# Patient Record
Sex: FEMALE | Race: WHITE | NOT HISPANIC OR LATINO | URBAN - METROPOLITAN AREA
[De-identification: names, ages, dates, MRNs, and addresses within clinical notes are randomized per-mention and may not be internally consistent; named-entity substitution may affect disease eponyms.]

---

## 2024-09-21 ENCOUNTER — EMERGENCY (EMERGENCY)
Facility: HOSPITAL | Age: 44
LOS: 1 days | Discharge: HOME | End: 2024-09-21
Attending: STUDENT IN AN ORGANIZED HEALTH CARE EDUCATION/TRAINING PROGRAM
Payer: MEDICAID

## 2024-09-21 VITALS
WEIGHT: 162.92 LBS | HEART RATE: 50 BPM | SYSTOLIC BLOOD PRESSURE: 121 MMHG | RESPIRATION RATE: 18 BRPM | DIASTOLIC BLOOD PRESSURE: 79 MMHG | OXYGEN SATURATION: 96 % | HEIGHT: 64.96 IN | TEMPERATURE: 98 F

## 2024-09-21 VITALS
SYSTOLIC BLOOD PRESSURE: 110 MMHG | TEMPERATURE: 98 F | OXYGEN SATURATION: 98 % | RESPIRATION RATE: 18 BRPM | HEART RATE: 66 BPM | DIASTOLIC BLOOD PRESSURE: 69 MMHG

## 2024-09-21 LAB
ALBUMIN SERPL ELPH-MCNC: 3.8 G/DL — SIGNIFICANT CHANGE UP (ref 3.5–5)
ALP SERPL-CCNC: 62 U/L — SIGNIFICANT CHANGE UP (ref 40–120)
ALT FLD-CCNC: 30 U/L DA — SIGNIFICANT CHANGE UP (ref 10–60)
ANION GAP SERPL CALC-SCNC: 4 MMOL/L — LOW (ref 5–17)
APPEARANCE UR: ABNORMAL
APTT BLD: 33.6 SEC — SIGNIFICANT CHANGE UP (ref 24.5–35.6)
AST SERPL-CCNC: 14 U/L — SIGNIFICANT CHANGE UP (ref 10–40)
BACTERIA # UR AUTO: ABNORMAL /HPF
BASOPHILS # BLD AUTO: 0.05 K/UL — SIGNIFICANT CHANGE UP (ref 0–0.2)
BASOPHILS NFR BLD AUTO: 0.7 % — SIGNIFICANT CHANGE UP (ref 0–2)
BILIRUB SERPL-MCNC: 0.3 MG/DL — SIGNIFICANT CHANGE UP (ref 0.2–1.2)
BILIRUB UR-MCNC: NEGATIVE — SIGNIFICANT CHANGE UP
BLD GP AB SCN SERPL QL: SIGNIFICANT CHANGE UP
BUN SERPL-MCNC: 10 MG/DL — SIGNIFICANT CHANGE UP (ref 7–18)
CALCIUM SERPL-MCNC: 8.9 MG/DL — SIGNIFICANT CHANGE UP (ref 8.4–10.5)
CHLORIDE SERPL-SCNC: 112 MMOL/L — HIGH (ref 96–108)
CO2 SERPL-SCNC: 28 MMOL/L — SIGNIFICANT CHANGE UP (ref 22–31)
COLOR SPEC: YELLOW — SIGNIFICANT CHANGE UP
CREAT SERPL-MCNC: 0.68 MG/DL — SIGNIFICANT CHANGE UP (ref 0.5–1.3)
DIFF PNL FLD: ABNORMAL
EGFR: 109 ML/MIN/1.73M2 — SIGNIFICANT CHANGE UP
EOSINOPHIL # BLD AUTO: 0.14 K/UL — SIGNIFICANT CHANGE UP (ref 0–0.5)
EOSINOPHIL NFR BLD AUTO: 2.1 % — SIGNIFICANT CHANGE UP (ref 0–6)
EPI CELLS # UR: PRESENT
GLUCOSE SERPL-MCNC: 131 MG/DL — HIGH (ref 70–99)
GLUCOSE UR QL: NEGATIVE MG/DL — SIGNIFICANT CHANGE UP
HCG SERPL-ACNC: <1 MIU/ML — SIGNIFICANT CHANGE UP
HCT VFR BLD CALC: 39.6 % — SIGNIFICANT CHANGE UP (ref 34.5–45)
HGB BLD-MCNC: 12.8 G/DL — SIGNIFICANT CHANGE UP (ref 11.5–15.5)
IMM GRANULOCYTES NFR BLD AUTO: 0.3 % — SIGNIFICANT CHANGE UP (ref 0–0.9)
INR BLD: 0.95 RATIO — SIGNIFICANT CHANGE UP (ref 0.85–1.18)
KETONES UR-MCNC: NEGATIVE MG/DL — SIGNIFICANT CHANGE UP
LEUKOCYTE ESTERASE UR-ACNC: NEGATIVE — SIGNIFICANT CHANGE UP
LYMPHOCYTES # BLD AUTO: 1.7 K/UL — SIGNIFICANT CHANGE UP (ref 1–3.3)
LYMPHOCYTES # BLD AUTO: 25 % — SIGNIFICANT CHANGE UP (ref 13–44)
MCHC RBC-ENTMCNC: 28.2 PG — SIGNIFICANT CHANGE UP (ref 27–34)
MCHC RBC-ENTMCNC: 32.3 GM/DL — SIGNIFICANT CHANGE UP (ref 32–36)
MCV RBC AUTO: 87.2 FL — SIGNIFICANT CHANGE UP (ref 80–100)
MONOCYTES # BLD AUTO: 0.55 K/UL — SIGNIFICANT CHANGE UP (ref 0–0.9)
MONOCYTES NFR BLD AUTO: 8.1 % — SIGNIFICANT CHANGE UP (ref 2–14)
NEUTROPHILS # BLD AUTO: 4.34 K/UL — SIGNIFICANT CHANGE UP (ref 1.8–7.4)
NEUTROPHILS NFR BLD AUTO: 63.8 % — SIGNIFICANT CHANGE UP (ref 43–77)
NITRITE UR-MCNC: NEGATIVE — SIGNIFICANT CHANGE UP
NRBC # BLD: 0 /100 WBCS — SIGNIFICANT CHANGE UP (ref 0–0)
PH UR: 7 — SIGNIFICANT CHANGE UP (ref 5–8)
PLATELET # BLD AUTO: 211 K/UL — SIGNIFICANT CHANGE UP (ref 150–400)
POTASSIUM SERPL-MCNC: 3.6 MMOL/L — SIGNIFICANT CHANGE UP (ref 3.5–5.3)
POTASSIUM SERPL-SCNC: 3.6 MMOL/L — SIGNIFICANT CHANGE UP (ref 3.5–5.3)
PROT SERPL-MCNC: 7.2 G/DL — SIGNIFICANT CHANGE UP (ref 6–8.3)
PROT UR-MCNC: NEGATIVE MG/DL — SIGNIFICANT CHANGE UP
PROTHROM AB SERPL-ACNC: 10.9 SEC — SIGNIFICANT CHANGE UP (ref 9.5–13)
RBC # BLD: 4.54 M/UL — SIGNIFICANT CHANGE UP (ref 3.8–5.2)
RBC # FLD: 13.2 % — SIGNIFICANT CHANGE UP (ref 10.3–14.5)
RBC CASTS # UR COMP ASSIST: ABNORMAL /HPF
SODIUM SERPL-SCNC: 144 MMOL/L — SIGNIFICANT CHANGE UP (ref 135–145)
SP GR SPEC: 1.02 — SIGNIFICANT CHANGE UP (ref 1–1.03)
UROBILINOGEN FLD QL: 0.2 MG/DL — SIGNIFICANT CHANGE UP (ref 0.2–1)
WBC # BLD: 6.8 K/UL — SIGNIFICANT CHANGE UP (ref 3.8–10.5)
WBC # FLD AUTO: 6.8 K/UL — SIGNIFICANT CHANGE UP (ref 3.8–10.5)
WBC UR QL: SIGNIFICANT CHANGE UP /HPF (ref 0–5)

## 2024-09-21 PROCEDURE — 36415 COLL VENOUS BLD VENIPUNCTURE: CPT

## 2024-09-21 PROCEDURE — 85730 THROMBOPLASTIN TIME PARTIAL: CPT

## 2024-09-21 PROCEDURE — 85610 PROTHROMBIN TIME: CPT

## 2024-09-21 PROCEDURE — 76856 US EXAM PELVIC COMPLETE: CPT

## 2024-09-21 PROCEDURE — 86901 BLOOD TYPING SEROLOGIC RH(D): CPT

## 2024-09-21 PROCEDURE — 85025 COMPLETE CBC W/AUTO DIFF WBC: CPT

## 2024-09-21 PROCEDURE — 76830 TRANSVAGINAL US NON-OB: CPT

## 2024-09-21 PROCEDURE — 81001 URINALYSIS AUTO W/SCOPE: CPT

## 2024-09-21 PROCEDURE — 86900 BLOOD TYPING SEROLOGIC ABO: CPT

## 2024-09-21 PROCEDURE — 99284 EMERGENCY DEPT VISIT MOD MDM: CPT

## 2024-09-21 PROCEDURE — 80053 COMPREHEN METABOLIC PANEL: CPT

## 2024-09-21 PROCEDURE — 86850 RBC ANTIBODY SCREEN: CPT

## 2024-09-21 PROCEDURE — 76830 TRANSVAGINAL US NON-OB: CPT | Mod: 26

## 2024-09-21 PROCEDURE — 76856 US EXAM PELVIC COMPLETE: CPT | Mod: 26

## 2024-09-21 PROCEDURE — 84702 CHORIONIC GONADOTROPIN TEST: CPT

## 2024-09-21 PROCEDURE — 99284 EMERGENCY DEPT VISIT MOD MDM: CPT | Mod: 25

## 2024-09-21 RX ADMIN — Medication 1000 MILLILITER(S): at 17:10

## 2024-09-21 NOTE — ED ADULT NURSE NOTE - NSFALLHARMRISKINTERV_ED_ALL_ED

## 2024-09-21 NOTE — ED PROVIDER NOTE - PATIENT PORTAL LINK FT
You can access the FollowMyHealth Patient Portal offered by [facility] by registering at the following website: [email or web address]. By joining [employer]’s FollowMyHealth portal, you will also be able to view your health information using other applications (apps) compatible with our system.

## 2024-09-21 NOTE — ED PROVIDER NOTE - OBJECTIVE STATEMENT
Patient declines , daughter at bedside assisting with interpretation.    44-year-old female with past medical history rheumatoid arthritis on leflunomide presents with vaginal bleeding x 2 months.  Patient reports that she had regular periods in the past, reports vaginal bleeding over the past 2 months.  Patient reports lower abdominal and back discomfort.  Patient states she used for partially soaked pads since this morning.  Patient states she followed up with OB/GYN last week, was prescribed medication, but states it did not help.  Patient reports mild dizziness, but denies any fevers, headache, vision change, chest pain, shortness of breath, vomiting, bloody stools, tarry stools, dysuria, numbness, focal weakness, or rash.  Denies aspirin or anticoagulation use.  Denies history of blood transfusions in the past.  Denies any additional complaints.

## 2024-09-21 NOTE — ED PROVIDER NOTE - NSFOLLOWUPINSTRUCTIONS_ED_ALL_ED_FT
Abnormal Uterine Bleeding       Abnormal uterine bleeding is unusual bleeding from the uterus. It includes bleeding after sex, or bleeding or spotting between menstrual periods. It may also include bleeding that is heavier than normal, menstrual periods that last longer than usual, or bleeding that occurs after menopause.    Abnormal uterine bleeding can affect teenagers, women in their reproductive years, pregnant women, and women who have reached menopause. Common causes of abnormal uterine bleeding include:    Pregnancy.  Growths of tissue (polyps).  Benign tumors or growths in the uterus (fibroids). These are not cancer.  Infection.  Cancer.  Too much or too little of some hormones in the body (hormonal imbalances).    Any type of abnormal bleeding should be checked by a health care provider. Many cases are minor and simple to treat, but others may be more serious. Treatment will depend on the cause and severity of the bleeding.    Follow these instructions at home:      Medicines    Take over-the-counter and prescription medicines only as told by your health care provider.  Tell your health care provider about other medicines that you take. You may be asked to stop taking aspirin or medicines that contain aspirin. These medicines can make bleeding worse.  If you were prescribed iron pills, take them as told by your health care provider. Iron pills help to replace iron that your body loses because of this condition.        Managing constipation    In cases of severe bleeding, you may be asked to increase your iron intake to treat anemia. This may cause constipation. To prevent or treat constipation, you may need to:    Drink enough fluid to keep your urine pale yellow.  Take over-the-counter or prescription medicines.  Eat foods that are high in fiber, such as beans, whole grains, and fresh fruits and vegetables.  Limit foods that are high in fat and processed sugars, such as fried or sweet foods.        General instructions    Monitor your condition for any changes.  Do not use tampons, douche, or have sex until your health care provider says these things are okay.  Change your pads often.  Get regular exams. This includes pelvic exams and cervical cancer screenings.    It is up to you to get the results of any tests that are done. Ask your health care provider, or the department that is doing the tests, when your results will be ready.  Keep all follow-up visits as told by your health care provider. This is important.    Contact a health care provider if you:  Have bleeding that lasts for more than 1 week.  Feel dizzy at times.  Feel nauseous or you vomit.  Feel light-headed or weak.  Notice any other changes that show that your condition is getting worse.    Get help right away if you:  Pass out.  Have bleeding that soaks through a pad every hour.  Have pain in the abdomen.  Have a fever or chills.  Become sweaty or weak.  Pass large blood clots from your vagina.    Summary  Abnormal uterine bleeding is unusual bleeding from the uterus.  Any type of abnormal bleeding should be evaluated by a health care provider. Many cases are minor and simple to treat, but others may be more serious.  Treatment will depend on the cause of the bleeding.  Get help right away if you pass out, you have bleeding that soaks through a pad every hour, or you pass large blood clots from your vagina.

## 2024-09-21 NOTE — ED PROVIDER NOTE - CLINICAL SUMMARY MEDICAL DECISION MAKING FREE TEXT BOX
Carmen: 44-year-old female with past medical history rheumatoid arthritis on leflunomide presents with vaginal bleeding x 2 months.  Patient reports that she had regular periods in the past, reports vaginal bleeding over the past 2 months.  Patient reports lower abdominal and back discomfort.  Patient states she used for partially soaked pads since this morning.  Patient states she followed up with OB/GYN last week, was prescribed medication, but states it did not help.  Patient reports mild dizziness, but denies any fevers, headache, vision change, chest pain, shortness of breath, vomiting, bloody stools, tarry stools, dysuria, numbness, focal weakness, or rash.  Denies aspirin or anticoagulation use.  Denies history of blood transfusions in the past.  Physical exam per above. Patient well appearing in NAD, hemodynamically stable. Will obtain labs r/o anemia r/o pregnancy, imaging, provide supportive treatment with dispo pending workup.

## 2024-09-21 NOTE — ED PROVIDER NOTE - PROGRESS NOTE DETAILS
Florian-DO: pt seen and re-evaluated at bedside.  Pt states their symptoms have improved.  Pt comfortable in NAD.  Labs/imaging non-actionable. Discussed GYN follow up, return precautions, pt understood and agreeable with plan.

## 2024-09-24 ENCOUNTER — EMERGENCY (EMERGENCY)
Facility: HOSPITAL | Age: 44
LOS: 1 days | Discharge: HOME | End: 2024-09-24
Attending: STUDENT IN AN ORGANIZED HEALTH CARE EDUCATION/TRAINING PROGRAM | Admitting: STUDENT IN AN ORGANIZED HEALTH CARE EDUCATION/TRAINING PROGRAM

## 2024-09-24 VITALS
SYSTOLIC BLOOD PRESSURE: 119 MMHG | HEART RATE: 70 BPM | OXYGEN SATURATION: 98 % | DIASTOLIC BLOOD PRESSURE: 82 MMHG | TEMPERATURE: 98 F | RESPIRATION RATE: 18 BRPM | HEIGHT: 64.96 IN

## 2024-09-24 LAB
ALBUMIN SERPL ELPH-MCNC: 4 G/DL — SIGNIFICANT CHANGE UP (ref 3.3–5)
ALP SERPL-CCNC: 58 U/L — SIGNIFICANT CHANGE UP (ref 40–120)
ALT FLD-CCNC: 19 U/L — SIGNIFICANT CHANGE UP (ref 4–33)
ANION GAP SERPL CALC-SCNC: 12 MMOL/L — SIGNIFICANT CHANGE UP (ref 7–14)
AST SERPL-CCNC: 18 U/L — SIGNIFICANT CHANGE UP (ref 4–32)
BASOPHILS # BLD AUTO: 0.04 K/UL — SIGNIFICANT CHANGE UP (ref 0–0.2)
BASOPHILS # BLD AUTO: 0.05 K/UL — SIGNIFICANT CHANGE UP (ref 0–0.2)
BASOPHILS NFR BLD AUTO: 0.6 % — SIGNIFICANT CHANGE UP (ref 0–2)
BASOPHILS NFR BLD AUTO: 0.7 % — SIGNIFICANT CHANGE UP (ref 0–2)
BILIRUB SERPL-MCNC: 0.4 MG/DL — SIGNIFICANT CHANGE UP (ref 0.2–1.2)
BLD GP AB SCN SERPL QL: NEGATIVE — SIGNIFICANT CHANGE UP
BUN SERPL-MCNC: 8 MG/DL — SIGNIFICANT CHANGE UP (ref 7–23)
CALCIUM SERPL-MCNC: 8.9 MG/DL — SIGNIFICANT CHANGE UP (ref 8.4–10.5)
CHLORIDE SERPL-SCNC: 108 MMOL/L — HIGH (ref 98–107)
CO2 SERPL-SCNC: 23 MMOL/L — SIGNIFICANT CHANGE UP (ref 22–31)
CREAT SERPL-MCNC: 0.54 MG/DL — SIGNIFICANT CHANGE UP (ref 0.5–1.3)
EGFR: 116 ML/MIN/1.73M2 — SIGNIFICANT CHANGE UP
EOSINOPHIL # BLD AUTO: 0.11 K/UL — SIGNIFICANT CHANGE UP (ref 0–0.5)
EOSINOPHIL # BLD AUTO: 0.12 K/UL — SIGNIFICANT CHANGE UP (ref 0–0.5)
EOSINOPHIL NFR BLD AUTO: 1.6 % — SIGNIFICANT CHANGE UP (ref 0–6)
EOSINOPHIL NFR BLD AUTO: 1.7 % — SIGNIFICANT CHANGE UP (ref 0–6)
GLUCOSE SERPL-MCNC: 89 MG/DL — SIGNIFICANT CHANGE UP (ref 70–99)
HCT VFR BLD CALC: 32.5 % — LOW (ref 34.5–45)
HCT VFR BLD CALC: 34.2 % — LOW (ref 34.5–45)
HGB BLD-MCNC: 10.6 G/DL — LOW (ref 11.5–15.5)
HGB BLD-MCNC: 11.4 G/DL — LOW (ref 11.5–15.5)
IANC: 4.67 K/UL — SIGNIFICANT CHANGE UP (ref 1.8–7.4)
IANC: 4.71 K/UL — SIGNIFICANT CHANGE UP (ref 1.8–7.4)
IMM GRANULOCYTES NFR BLD AUTO: 0.1 % — SIGNIFICANT CHANGE UP (ref 0–0.9)
IMM GRANULOCYTES NFR BLD AUTO: 0.3 % — SIGNIFICANT CHANGE UP (ref 0–0.9)
LYMPHOCYTES # BLD AUTO: 1.42 K/UL — SIGNIFICANT CHANGE UP (ref 1–3.3)
LYMPHOCYTES # BLD AUTO: 1.72 K/UL — SIGNIFICANT CHANGE UP (ref 1–3.3)
LYMPHOCYTES # BLD AUTO: 20.6 % — SIGNIFICANT CHANGE UP (ref 13–44)
LYMPHOCYTES # BLD AUTO: 24.1 % — SIGNIFICANT CHANGE UP (ref 13–44)
MCHC RBC-ENTMCNC: 28 PG — SIGNIFICANT CHANGE UP (ref 27–34)
MCHC RBC-ENTMCNC: 28.4 PG — SIGNIFICANT CHANGE UP (ref 27–34)
MCHC RBC-ENTMCNC: 32.6 GM/DL — SIGNIFICANT CHANGE UP (ref 32–36)
MCHC RBC-ENTMCNC: 33.3 GM/DL — SIGNIFICANT CHANGE UP (ref 32–36)
MCV RBC AUTO: 85.1 FL — SIGNIFICANT CHANGE UP (ref 80–100)
MCV RBC AUTO: 86 FL — SIGNIFICANT CHANGE UP (ref 80–100)
MONOCYTES # BLD AUTO: 0.53 K/UL — SIGNIFICANT CHANGE UP (ref 0–0.9)
MONOCYTES # BLD AUTO: 0.61 K/UL — SIGNIFICANT CHANGE UP (ref 0–0.9)
MONOCYTES NFR BLD AUTO: 7.4 % — SIGNIFICANT CHANGE UP (ref 2–14)
MONOCYTES NFR BLD AUTO: 8.9 % — SIGNIFICANT CHANGE UP (ref 2–14)
NEUTROPHILS # BLD AUTO: 4.67 K/UL — SIGNIFICANT CHANGE UP (ref 1.8–7.4)
NEUTROPHILS # BLD AUTO: 4.71 K/UL — SIGNIFICANT CHANGE UP (ref 1.8–7.4)
NEUTROPHILS NFR BLD AUTO: 66.1 % — SIGNIFICANT CHANGE UP (ref 43–77)
NEUTROPHILS NFR BLD AUTO: 67.9 % — SIGNIFICANT CHANGE UP (ref 43–77)
NRBC # BLD: 0 /100 WBCS — SIGNIFICANT CHANGE UP (ref 0–0)
NRBC # BLD: 0 /100 WBCS — SIGNIFICANT CHANGE UP (ref 0–0)
NRBC # FLD: 0 K/UL — SIGNIFICANT CHANGE UP (ref 0–0)
NRBC # FLD: 0 K/UL — SIGNIFICANT CHANGE UP (ref 0–0)
PLATELET # BLD AUTO: 191 K/UL — SIGNIFICANT CHANGE UP (ref 150–400)
PLATELET # BLD AUTO: 201 K/UL — SIGNIFICANT CHANGE UP (ref 150–400)
POTASSIUM SERPL-MCNC: 3.9 MMOL/L — SIGNIFICANT CHANGE UP (ref 3.5–5.3)
POTASSIUM SERPL-SCNC: 3.9 MMOL/L — SIGNIFICANT CHANGE UP (ref 3.5–5.3)
PROT SERPL-MCNC: 6.8 G/DL — SIGNIFICANT CHANGE UP (ref 6–8.3)
RBC # BLD: 3.78 M/UL — LOW (ref 3.8–5.2)
RBC # BLD: 4.02 M/UL — SIGNIFICANT CHANGE UP (ref 3.8–5.2)
RBC # FLD: 13.1 % — SIGNIFICANT CHANGE UP (ref 10.3–14.5)
RBC # FLD: 13.1 % — SIGNIFICANT CHANGE UP (ref 10.3–14.5)
RH IG SCN BLD-IMP: NEGATIVE — SIGNIFICANT CHANGE UP
SODIUM SERPL-SCNC: 143 MMOL/L — SIGNIFICANT CHANGE UP (ref 135–145)
WBC # BLD: 6.88 K/UL — SIGNIFICANT CHANGE UP (ref 3.8–10.5)
WBC # BLD: 7.13 K/UL — SIGNIFICANT CHANGE UP (ref 3.8–10.5)
WBC # FLD AUTO: 6.88 K/UL — SIGNIFICANT CHANGE UP (ref 3.8–10.5)
WBC # FLD AUTO: 7.13 K/UL — SIGNIFICANT CHANGE UP (ref 3.8–10.5)

## 2024-09-24 PROCEDURE — 93010 ELECTROCARDIOGRAM REPORT: CPT

## 2024-09-24 PROCEDURE — 99223 1ST HOSP IP/OBS HIGH 75: CPT

## 2024-09-24 RX ORDER — NORETHINDRONE 0.35 MG/1
5 TABLET ORAL ONCE
Refills: 0 | Status: COMPLETED | OUTPATIENT
Start: 2024-09-24 | End: 2024-09-25

## 2024-09-24 RX ORDER — ACETAMINOPHEN 325 MG/1
650 TABLET ORAL ONCE
Refills: 0 | Status: COMPLETED | OUTPATIENT
Start: 2024-09-24 | End: 2024-09-24

## 2024-09-24 RX ORDER — SODIUM CHLORIDE 9 MG/ML
1000 INJECTION INTRAMUSCULAR; INTRAVENOUS; SUBCUTANEOUS ONCE
Refills: 0 | Status: COMPLETED | OUTPATIENT
Start: 2024-09-24 | End: 2024-09-24

## 2024-09-24 RX ORDER — METOCLOPRAMIDE HCL 5 MG
10 TABLET ORAL ONCE
Refills: 0 | Status: COMPLETED | OUTPATIENT
Start: 2024-09-24 | End: 2024-09-24

## 2024-09-24 RX ORDER — ACETAMINOPHEN 325 MG/1
975 TABLET ORAL ONCE
Refills: 0 | Status: COMPLETED | OUTPATIENT
Start: 2024-09-24 | End: 2024-09-24

## 2024-09-24 RX ADMIN — Medication 10 MILLIGRAM(S): at 16:40

## 2024-09-24 RX ADMIN — ACETAMINOPHEN 650 MILLIGRAM(S): 325 TABLET ORAL at 18:37

## 2024-09-24 RX ADMIN — SODIUM CHLORIDE 1000 MILLILITER(S): 9 INJECTION INTRAMUSCULAR; INTRAVENOUS; SUBCUTANEOUS at 15:02

## 2024-09-24 NOTE — ED ADULT TRIAGE NOTE - CHIEF COMPLAINT QUOTE
pt coming from GYN office, pt sent to ED for heavy vag bleed x months.  pt was treated at [facility] on 9/14 and d/c'd  pt c/o weakness.

## 2024-09-24 NOTE — ED PROVIDER NOTE - CLINICAL SUMMARY MEDICAL DECISION MAKING FREE TEXT BOX
Pt with h/o menometrorrhagia currently being worked up with OBGYN noting increase in bleeding and now room spinning dizziness with 1 episode of vomiting. Will r/o anemia and give hydration; consider meclizine for suspected BPPV. Will likely need further outpt heme and obgyn care  -cbc, cmp,type and screen, ivh

## 2024-09-24 NOTE — ED PROVIDER NOTE - NEUROLOGICAL GAIT WEIGHT BEARING
pt symptomatic upon standing but able to stand without significant wavering, will assess after tx of dizziness

## 2024-09-24 NOTE — ED ADULT NURSE NOTE - CAS ELECT INFOMATION PROVIDED
Discharge instructions given to pt. Pt verbalized understanding. All questions have been addressed. Discharged pt in stable condition. PIV removed.   
ERP at bedside  
Pt medicated per MAR with 1,000mL of LR. Call light within reach, pt denies further needs at this time. Pt is on monitoring for oximetry, pulse, blood pressure, and cardiac monitoring.  
by acp thery/DC instructions

## 2024-09-24 NOTE — CONSULT NOTE ADULT - SUBJECTIVE AND OBJECTIVE BOX
ADILIA MITCHELL  44y  Female [id number]    HPI:    44y P4 LMP 2 months ago presenting due to onset of heavy bleeding for the past 2 weeks. Patient initially followed up with GYN last week and was given oral TXA however did not tolerate and only took one dose. At that time, they discussed plan for possible endometrial sampling and hysteroscopy per patient. Patient then presented to ED on Sunday due to continued bleeding. At that time H/H was stable and TVUS performed only remarkable for 2.1cm endometrial lining and she was discharged home. GYN was not consulted at the time. Patient presented today due to new lightheadedness, dizziness, shortness of breath and fatigue. She reports she has been using 5 pads per day. Otherwise asymptomatic.        Name of GYN Physician: Dr. Mckeon  POB: [id number]  Pgyn: Denies fibroids, cysts, endometriosis, STI's, Abnormal pap smears   PMH: Rheumatoid arthritis  PSH: None  Meds: None  All: NKDA    Vital Signs Last 24 Hrs  T(C): 36.7 (24 Sep 2024 18:09), Max: 36.7 (24 Sep 2024 13:28)  T(F): 98 (24 Sep 2024 18:09), Max: 98 (24 Sep 2024 13:28)  HR: 69 (24 Sep 2024 18:09) (69 - 70)  BP: 114/58 (24 Sep 2024 18:09) (114/58 - 119/82)  BP(mean): --  RR: 18 (24 Sep 2024 18:09) (18 - 18)  SpO2: 100% (24 Sep 2024 18:09) (98% - 100%)    Parameters below as of 24 Sep 2024 18:09  Patient On (Oxygen Delivery Method): room air        Physical Exam:   General: sitting comfortably in bed, NAD   CV: RR  Lungs: Normal respiratory effort on room air  Abd: Soft, non-tender, non-distended.    : Pad ~50% saturated after 1.5 hours. External labia wnl. Bimanual exam with no cervical motion tenderness, uterus wnl, adnexa non palpable b/l. Cervix dilated 0.5 cm   Speculum Exam: 10cc of blood in vault, cleared. Small slow trickle from cervical os.   Ext: non-tender b/l, no edema     LABS:                              10.6   6.88  )-----------( 191      ( 24 Sep 2024 18:46 )             32.5     09-24    143  |  108[H]  |  8   ----------------------------<  89  3.9   |  23  |  0.54    Ca    8.9      24 Sep 2024 14:50    TPro  6.8  /  Alb  4.0  /  TBili  0.4  /  DBili  x   /  AST  18  /  ALT  19  /  AlkPhos  58  09-24    I&O's Detail      Urinalysis Basic - ( 24 Sep 2024 14:50 )    Color: x / Appearance: x / SG: x / pH: x  Gluc: 89 mg/dL / Ketone: x  / Bili: x / Urobili: x   Blood: x / Protein: x / Nitrite: x   Leuk Esterase: x / RBC: x / WBC x   Sq Epi: x / Non Sq Epi: x / Bacteria: x        RADIOLOGY & ADDITIONAL STUDIES:  < from: US Transvaginal (09.21.24 @ 18:11) >    ACC: [id number] EXAM:  US TRANSVAGINAL   ORDERED BY: NORIS VALENTIN     ACC: [id number] EXAM:  US PELVIC COMPLETE   ORDERED BY: NORIS VALENTIN     PROCEDURE DATE:  09/21/2024          INTERPRETATION:  CLINICAL INFORMATION: Abnormal vaginal bleeding for 2   months.    LMP: Unknown.    COMPARISON: None available.    TECHNIQUE:  Endovaginal and transabdominal pelvic sonogram.    FINDINGS:  Uterus: 10.5 cm x 4.9 cm x 6.5 cm. Within normal limits.  Endometrium: 21 mm. Thickened.    Nabothian cysts measuring up to 2.0 cm.    Right ovary: 2.5 cm x 1.8 cm x 1.7 cm. There is a 1.4 cm dominant   follicle.  Left ovary: 2.0 cm x 1.2 cm x 1.6 cm. Within normal limits.    Fluid: None.    IMPRESSION:  Thickened endometrial stripe to 2.1 cm. Further gynecologic evaluation is   recommended.        --- End of Report ---            JUAN DANIEL FALK MD; Attending Radiologist  This document has been electronically signed. Sep 21 2024  6:34PM    < end of copied text >

## 2024-09-24 NOTE — ED PROVIDER NOTE - ATTENDING APP SHARED VISIT CONTRIBUTION OF CARE
I personally made the management plan, and take responsibility for the patient's management. I have discussed with and reviewed the PA's note and agree with the History, ROS, Physical Exam and MDM unless otherwise indicated. A brief summary of my personal evaluation and impression can be found below.    44-year-old female with a past medical history of rheumatoid arthritis on leflunomide with vaginal bleeding she has had for 2 months and room spinning dizziness since yesterday.  Patient was seen in [facility] emergency department had a ultrasound performed that showed a thickened endometrial stripe, went to your OB today to have a uteroscopy based on the bleeding they recommended she be evaluated in the emergency department. Pt  with overall fatigue and montgomery and intermittent paresthesias to extremities x 4. Denies fever, chills, CP, SOB, abdominal pain, dysuria, numbness/weakness. I personally made the management plan, and take responsibility for the patient's management. I have discussed with and reviewed the PA's note and agree with the History, ROS, Physical Exam and MDM unless otherwise indicated. A brief summary of my personal evaluation and impression can be found below.    44-year-old female with a past medical history of rheumatoid arthritis on leflunomide with vaginal bleeding she has had for 2 months and room spinning dizziness since yesterday.  Patient was seen in [place] emergency department had a ultrasound performed that showed a thickened endometrial stripe, went to your OB today to have a uteroscopy based on the bleeding they recommended she be evaluated in the emergency department. Pt  with overall fatigue and montgomery and intermittent paresthesias to extremities x 4. Denies fever, chills, CP, SOB, abdominal pain, dysuria, numbness/weakness.    General: Well-appearing, NAD  Head: Atraumatic, normocephalic  Eyes: EOM grossly in tact, no scleral icterus  ENT: moist mucous membranes  Neurology: A&Ox 3 , nonfocal, LEMONS x 4  Respiratory: normal respiratory effort  CV: Extremities warm and well perfused  Abdominal: Soft, non-distended  Extremities: No edema  Skin: No rashes   Performed by VAISHALI Hunt: scant amount of bleeding in vaginal fornices, slow trickle from os, no discharge    Differential diagnosis includes but is not limited to anemia due to acute blood loss, BPPV, vertigo. will check Hb, no indication for repeat imaging given absence of pain. will give IVF and reglan if not anemic. likely dc if sx improve.

## 2024-09-24 NOTE — CONSULT NOTE ADULT - ASSESSMENT
A/P: 44y P4 LMP 2 months ago presenting due to onset of heavy bleeding for the past 2 weeks. Patient initially followed up with GYN last week and was given oral TXA however did not tolerate and only took one dose. At that time, they discussed plan for possible endometrial sampling and hysteroscopy per patient. Patient then presented to ED on Sunday due to continued bleeding. At that time H/H was stable and TVUS performed only remarkable for 2.1cm endometrial lining and she was discharged home. GYN was not consulted at the time. Patient presented today due to new lightheadedness, dizziness, shortness of breath and fatigue. Vital signs stable. Hgb downtrending to 10.6 from 11.4 however appears to possibly be dilutional as well as patient received 1L bolus in ED. On exam, patient with slow trickle of bleeding from cervical os however not heavy. Agree with CDU to continue to monitor bleeding.  - No acute GYN intervention  - Recommend IV TXA and Aygestin 5mg to be given in ED  - If patient with continued stable serial H/H, patient to be discharged on Aygestin with plan for follow up with Dr. Mckeon this week  - Defer transfusion at this time    d/w Dr. Zahraa Lindquist, PGY-2

## 2024-09-24 NOTE — ED CDU PROVIDER INITIAL DAY NOTE - ATTENDING APP SHARED VISIT CONTRIBUTION OF CARE
I have made the decision to admit this patient to the CDU as documented in the Provider note I have reviewed the note  written by the CDU Physician Assistant, on that visit day. I have supervised and participated as necessary in the performance of procedures indicated for patient management and was available at all phases of the patient´s visit when needed.    Please see the  provider note for the details of the decision to admit  Vital Signs Stable  PE unchanged at time of admission  Patient with persistent hcg- vaginal bleeding and steadily dropping H/H in the setting of thickened endometrium as well as dizziness prompting CDU visit for symptomatic tx, serial exams trending of CBCs and further consultant input if necessary  GYN aware of patient will followup reccs

## 2024-09-24 NOTE — ED ADULT NURSE NOTE - CHIEF COMPLAINT QUOTE
pt coming from GYN office, pt sent to ED for heavy vag bleed x months.  pt was treated at [facility] on 9/14 and d/c'd  pt c/o weakness.

## 2024-09-24 NOTE — ED PROVIDER NOTE - OBJECTIVE STATEMENT
services offered, pt prefers daughter to translate.  45 y/o F PMH RA on leflunomide c/o vaginal bleeding x 2 months and room spinning dizziness since yesterday with associated n/v x1. Pt has been following up with her OBGYN, given medication (unknown) but states she took 1 tab yesterday for the first time but threw it up. Pt saw [facility] ED on 9/21 for the vaginal bleeding, h/h was nonactionable and has TVUS which showed thickened endometrium and was discharged and advised to follow back up with her OBGYN. Pt saw OBGYN today, states she was supposed to have a hysteroscopy today but was told to come to ED. When asked why she was sent to ED, pts daughter handed a referral for hematology. Pt notes dizziness has worsened today and does not feel comfortable walking without family member assistance. Notes she had been able to keep food down last night but has not eaten today and has not continued the medication she was given from her OBGYN (states supposed to take this med 3x/day x 2 days for the vaginal bleeding). Pt states she is currently changing a saturated pad every 3 minutes. Admits to overall fatigue and montgomery and intermittent paresthesias to extremities x 4. Denies fever, chills, CP, SOB, abdominal pain, dysuria, numbness/weakness.

## 2024-09-24 NOTE — ED PROVIDER NOTE - PROGRESS NOTE DETAILS
Discussed drop in hemoglobin, will call OBGYN for consult to discuss possible txa and likely CDU for serial CBCs

## 2024-09-24 NOTE — ED ADULT NURSE NOTE - OBJECTIVE STATEMENT
Pt with vaginal bleeding x 2 months changing [place]son pads frequently bright red blood noted. . Pt co feeling dizzy. Pts daughter at bedside. Pt with no co sob no chest pain. Pt awaiting dispo. Pt [place] citizen speaking daughter at bedside translating/ { BLAIR [phone]}

## 2024-09-24 NOTE — ED CDU PROVIDER INITIAL DAY NOTE - CLINICAL SUMMARY MEDICAL DECISION MAKING FREE TEXT BOX
Pt is a 45 YO F with PMH RA who presented to ED with vaginal bleeding x 2 months with associated fatigue and lightheadedness. In ED, pt H/H 11.4/34.2 -->10.6/32.5. Pt had TVUS on 9/21 at [facility] which showed thickened endometrium. Pt seen and examined by OBGYN. Pt placed in CDU for serial H/H, treatments as needed.

## 2024-09-24 NOTE — ED ADULT TRIAGE NOTE - TEMPERATURE IN CELSIUS (DEGREES C)
What Best Describes The Level Of Symmetry? (Check All That Apply): good How Severe Is Your Pain?: mild How Is Your Wound Healing?: healing well Compared To The Last Evaluation, How Have The Findings Changed?: improved Date Of Procedure: 8/23/2018 36.7

## 2024-09-24 NOTE — ED CDU PROVIDER INITIAL DAY NOTE - OBJECTIVE STATEMENT
services offered, pt prefers daughter to translate.  45 y/o F PMH RA on leflunomide c/o vaginal bleeding x 2 months and room spinning dizziness since yesterday with associated n/v x1. Pt has been following up with her OBGYN, given medication (unknown) but states she took 1 tab yesterday for the first time but threw it up. Pt saw [facility] ED on 9/21 for the vaginal bleeding, h/h was nonactionable and has TVUS which showed thickened endometrium and was discharged and advised to follow back up with her OBGYN. Pt saw OBGYN today, states she was supposed to have a hysteroscopy today but was told to come to ED. When asked why she was sent to ED, pts daughter handed a referral for hematology. Pt notes dizziness has worsened today and does not feel comfortable walking without family member assistance. Notes she had been able to keep food down last night but has not eaten today and has not continued the medication she was given from her OBGYN (states supposed to take this med 3x/day x 2 days for the vaginal bleeding). Pt states she is currently changing a saturated pad every 3 minutes. Admits to overall fatigue and montgomery and intermittent paresthesias to extremities x 4. Denies fever, chills, CP, SOB, abdominal pain, dysuria, numbness/weakness.    ED HPI as above, noted  Pt is a 45 YO F with PMH RA who presented to ED with vaginal bleeding x 2 months with associated fatigue and lightheadedness. In ED, pt H/H 11.4/34.2 -->10.6/32.5. Pt had TVUS on 9/21 at [facility] which showed thickened endometrium. Pt seen and examined by OBGYN. Pt placed in CDU for serial H/H, treatments as needed.

## 2024-09-25 VITALS
SYSTOLIC BLOOD PRESSURE: 101 MMHG | OXYGEN SATURATION: 98 % | TEMPERATURE: 97 F | DIASTOLIC BLOOD PRESSURE: 64 MMHG | RESPIRATION RATE: 16 BRPM | HEART RATE: 71 BPM

## 2024-09-25 LAB
A1C WITH ESTIMATED AVERAGE GLUCOSE RESULT: 4.9 % — SIGNIFICANT CHANGE UP (ref 4–5.6)
BASOPHILS # BLD AUTO: 0.04 K/UL — SIGNIFICANT CHANGE UP (ref 0–0.2)
BASOPHILS # BLD AUTO: 0.05 K/UL — SIGNIFICANT CHANGE UP (ref 0–0.2)
BASOPHILS NFR BLD AUTO: 0.6 % — SIGNIFICANT CHANGE UP (ref 0–2)
BASOPHILS NFR BLD AUTO: 0.9 % — SIGNIFICANT CHANGE UP (ref 0–2)
EOSINOPHIL # BLD AUTO: 0.11 K/UL — SIGNIFICANT CHANGE UP (ref 0–0.5)
EOSINOPHIL # BLD AUTO: 0.14 K/UL — SIGNIFICANT CHANGE UP (ref 0–0.5)
EOSINOPHIL NFR BLD AUTO: 1.9 % — SIGNIFICANT CHANGE UP (ref 0–6)
EOSINOPHIL NFR BLD AUTO: 2 % — SIGNIFICANT CHANGE UP (ref 0–6)
ESTIMATED AVERAGE GLUCOSE: 94 — SIGNIFICANT CHANGE UP
FERRITIN SERPL-MCNC: 23 NG/ML — SIGNIFICANT CHANGE UP (ref 15–150)
HCT VFR BLD CALC: 30.2 % — LOW (ref 34.5–45)
HCT VFR BLD CALC: 31.5 % — LOW (ref 34.5–45)
HGB BLD-MCNC: 10.4 G/DL — LOW (ref 11.5–15.5)
HGB BLD-MCNC: 9.9 G/DL — LOW (ref 11.5–15.5)
IANC: 3.43 K/UL — SIGNIFICANT CHANGE UP (ref 1.8–7.4)
IANC: 4.04 K/UL — SIGNIFICANT CHANGE UP (ref 1.8–7.4)
IMM GRANULOCYTES NFR BLD AUTO: 0.2 % — SIGNIFICANT CHANGE UP (ref 0–0.9)
IMM GRANULOCYTES NFR BLD AUTO: 0.4 % — SIGNIFICANT CHANGE UP (ref 0–0.9)
IRON SATN MFR SERPL: 15 % — SIGNIFICANT CHANGE UP (ref 14–50)
IRON SATN MFR SERPL: 40 UG/DL — SIGNIFICANT CHANGE UP (ref 30–160)
LYMPHOCYTES # BLD AUTO: 1.7 K/UL — SIGNIFICANT CHANGE UP (ref 1–3.3)
LYMPHOCYTES # BLD AUTO: 2.02 K/UL — SIGNIFICANT CHANGE UP (ref 1–3.3)
LYMPHOCYTES # BLD AUTO: 28.9 % — SIGNIFICANT CHANGE UP (ref 13–44)
LYMPHOCYTES # BLD AUTO: 29.5 % — SIGNIFICANT CHANGE UP (ref 13–44)
MCHC RBC-ENTMCNC: 28 PG — SIGNIFICANT CHANGE UP (ref 27–34)
MCHC RBC-ENTMCNC: 28.3 PG — SIGNIFICANT CHANGE UP (ref 27–34)
MCHC RBC-ENTMCNC: 32.8 GM/DL — SIGNIFICANT CHANGE UP (ref 32–36)
MCHC RBC-ENTMCNC: 33 GM/DL — SIGNIFICANT CHANGE UP (ref 32–36)
MCV RBC AUTO: 85.3 FL — SIGNIFICANT CHANGE UP (ref 80–100)
MCV RBC AUTO: 85.8 FL — SIGNIFICANT CHANGE UP (ref 80–100)
MONOCYTES # BLD AUTO: 0.58 K/UL — SIGNIFICANT CHANGE UP (ref 0–0.9)
MONOCYTES # BLD AUTO: 0.58 K/UL — SIGNIFICANT CHANGE UP (ref 0–0.9)
MONOCYTES NFR BLD AUTO: 8.5 % — SIGNIFICANT CHANGE UP (ref 2–14)
MONOCYTES NFR BLD AUTO: 9.9 % — SIGNIFICANT CHANGE UP (ref 2–14)
NEUTROPHILS # BLD AUTO: 3.43 K/UL — SIGNIFICANT CHANGE UP (ref 1.8–7.4)
NEUTROPHILS # BLD AUTO: 4.04 K/UL — SIGNIFICANT CHANGE UP (ref 1.8–7.4)
NEUTROPHILS NFR BLD AUTO: 58.2 % — SIGNIFICANT CHANGE UP (ref 43–77)
NEUTROPHILS NFR BLD AUTO: 59 % — SIGNIFICANT CHANGE UP (ref 43–77)
NRBC # BLD: 0 /100 WBCS — SIGNIFICANT CHANGE UP (ref 0–0)
NRBC # BLD: 0 /100 WBCS — SIGNIFICANT CHANGE UP (ref 0–0)
NRBC # FLD: 0 K/UL — SIGNIFICANT CHANGE UP (ref 0–0)
NRBC # FLD: 0 K/UL — SIGNIFICANT CHANGE UP (ref 0–0)
PLATELET # BLD AUTO: 177 K/UL — SIGNIFICANT CHANGE UP (ref 150–400)
PLATELET # BLD AUTO: 185 K/UL — SIGNIFICANT CHANGE UP (ref 150–400)
RBC # BLD: 3.54 M/UL — LOW (ref 3.8–5.2)
RBC # BLD: 3.67 M/UL — LOW (ref 3.8–5.2)
RBC # FLD: 13 % — SIGNIFICANT CHANGE UP (ref 10.3–14.5)
RBC # FLD: 13.2 % — SIGNIFICANT CHANGE UP (ref 10.3–14.5)
TIBC SERPL-MCNC: 269 UG/DL — SIGNIFICANT CHANGE UP (ref 220–430)
UIBC SERPL-MCNC: 229 UG/DL — SIGNIFICANT CHANGE UP (ref 110–370)
WBC # BLD: 5.88 K/UL — SIGNIFICANT CHANGE UP (ref 3.8–10.5)
WBC # BLD: 6.85 K/UL — SIGNIFICANT CHANGE UP (ref 3.8–10.5)
WBC # FLD AUTO: 5.88 K/UL — SIGNIFICANT CHANGE UP (ref 3.8–10.5)
WBC # FLD AUTO: 6.85 K/UL — SIGNIFICANT CHANGE UP (ref 3.8–10.5)

## 2024-09-25 PROCEDURE — 99239 HOSP IP/OBS DSCHRG MGMT >30: CPT

## 2024-09-25 RX ORDER — NORETHINDRONE 0.35 MG/1
1 TABLET ORAL
Qty: 15 | Refills: 0
Start: 2024-09-25 | End: 2024-10-09

## 2024-09-25 RX ADMIN — ACETAMINOPHEN 975 MILLIGRAM(S): 325 TABLET ORAL at 00:37

## 2024-09-25 RX ADMIN — Medication 220 MILLIGRAM(S): at 00:38

## 2024-09-25 RX ADMIN — ACETAMINOPHEN 975 MILLIGRAM(S): 325 TABLET ORAL at 01:37

## 2024-09-25 RX ADMIN — NORETHINDRONE 5 MILLIGRAM(S): 0.35 TABLET ORAL at 00:36

## 2024-09-25 NOTE — ED ADULT NURSE REASSESSMENT NOTE - NS ED NURSE REASSESS COMMENT FT1
Pt received from main ED, denies any pain. SOB.  still reports vaginal bleeding. meds administered as ordered. call bell with in reach. will continue to monitor

## 2024-09-25 NOTE — PROGRESS NOTE ADULT - ASSESSMENT
A/P: 44y P4 LMP 9/17/24 with heavy bleeding every day since who presented yesterday with new lightheadedness, dizziness, SOB, and fatigue. Patient initially prescribed Lysteda by her GYN but did not tolerate this medication and only took one dose. In the ED, patient given IV TXA 1g, started on Aygestin 5 mg QD, and placed in CDU for H/H trend and pad counts. This morning, she reports her symptoms have resolved and bleeding improved. She remains hemodynamically stable with H/H trend nonactionable.    - Awaiting AM labs  - If H/H stable this AM, patient can be discharged with Aygestin 5 mg QD x2 weeks and instructions to follow-up with her GYN within one week  - Patient will likely need endometrial sampling given AUB at age 44; per records this was discussed with her outpatient    d/w Dr. Zahraa Jarvis PGY3 A/P: 44y P4 LMP 9/17/24 with heavy bleeding every day since who presented yesterday with new lightheadedness, dizziness, SOB, and fatigue. Prior LMP before this was 2 months ago. Patient initially prescribed Lysteda by her GYN but did not tolerate this medication and only took one dose. In the ED, patient given IV TXA 1g, started on Aygestin 5 mg QD, and placed in CDU for H/H trend and pad counts. This morning, she reports her symptoms have resolved and bleeding improved. She remains hemodynamically stable with H/H trend nonactionable. Differential for AUB includes anovulatory bleeding in lawson-menopausal woman.    - Awaiting AM labs  - If H/H stable this AM, patient can be discharged with Aygestin 5 mg QD x2 weeks and instructions to follow-up with her GYN within one week  - Patient will likely need endometrial sampling given AUB at age 44; per records this was discussed with her outpatient    d/w Dr. Zahraa Jarvis PGY3

## 2024-09-25 NOTE — ED CDU PROVIDER SUBSEQUENT DAY NOTE - CLINICAL SUMMARY MEDICAL DECISION MAKING FREE TEXT BOX
Pt is a 43 YO F with PMH RA who presented to ED with vaginal bleeding x 2 months with associated fatigue and lightheadedness. In ED, pt H/H 11.4/34.2 -->10.6/32.5. Pt had TVUS on 9/21 at [facility] which showed thickened endometrium. Pt seen and examined by OBGYN. Pt placed in CDU for serial H/H, treatments as needed.

## 2024-09-25 NOTE — PROGRESS NOTE ADULT - SUBJECTIVE AND OBJECTIVE BOX
GYN Progress Note  : patient's family at bedside; declines official     Subjective:   Patient seen and examined at bedside. No acute complaints. Reports bleeding is now "normal" and improved from yesterday. Denies lightheadedness, CP, SOB, N/V, fevers, and chills.      Vital Signs Last 24 Hours  T(C): 36.4 (09-25-24 @ 05:58), Max: 36.7 (09-24-24 @ 13:28)  HR: 64 (09-25-24 @ 05:58) (64 - 73)  BP: 93/61 (09-25-24 @ 05:58) (93/61 - 119/82)  RR: 16 (09-25-24 @ 05:58) (16 - 18)  SpO2: 99% (09-25-24 @ 05:58) (98% - 100%)    Physical Exam:  General: NAD, A+O x3  CV: Clinically well-perfused  Lungs: Breathing comfortably on room air  Abdomen: Soft, nondistended, non-tender  : No blood on pad, in place x3 hours  Ext: No lower extremity edema or calf tenderness bilaterally    Labs:                        9.9    5.88  )-----------( 185      ( 25 Sep 2024 00:32 )             30.2   baso 0.9    eos 1.9    imm gran 0.2    lymph 28.9   mono 9.9    poly 58.2                         10.6   6.88  )-----------( 191      ( 24 Sep 2024 18:46 )             32.5   baso 0.7    eos 1.6    imm gran 0.3    lymph 20.6   mono 8.9    poly 67.9                         11.4   7.13  )-----------( 201      ( 24 Sep 2024 14:50 )             34.2   baso 0.6    eos 1.7    imm gran 0.1    lymph 24.1   mono 7.4    poly 66.1         I&O's Summary

## 2024-09-25 NOTE — ED CDU PROVIDER SUBSEQUENT DAY NOTE - HISTORY
Pt is a 43 YO F with PMH RA who presented to ED with vaginal bleeding x 2 months with associated fatigue and lightheadedness. In ED, pt H/H 11.4/34.2 -->10.6/32.5. Pt had TVUS on 9/21 at [facility] which showed thickened endometrium. Pt seen and examined by OBGYN. Pt placed in CDU for serial H/H, treatments as needed.    In the interim- pt with continued vaginal bleeding- pending repeat CBC

## 2024-09-25 NOTE — ED CDU PROVIDER DISPOSITION NOTE - CLINICAL COURSE
45 YO F with PMH RA who presented to ED with vaginal bleeding x 2 months with associated fatigue and lightheadedness. In ED, pt H/H 11.4/34.2 -->10.6/32.5. Pt had TVUS on 9/21 at [facility] which showed thickened endometrium. Pt seen and examined by OBGYN. Pt placed in CDU for serial H/H, treatments as needed. Pt seen this morning during rounds, feeling much better, no heavy bleeding at this time. H/H stable on AM labs. Seen and cleared by GYN for discharge home. Outpatient follow up. Strict return precautions.

## 2024-09-25 NOTE — ED CDU PROVIDER DISPOSITION NOTE - NSFOLLOWUPINSTRUCTIONS_ED_ALL_ED_FT
Please take the following medications as prescribed:  - Aygestin 5 mg once a day for 2 weeks and     Please follow up with your Gynecologist within one week.    Follow up with your PMD within 1-2 days or you can call our clinic at [phone] for an appointment  Take all of your other medications as previously prescribed.  Worsening, continued or ANY new concerning symptoms return to the Emergency Department.    Abnormal Uterine Bleeding  A female body showing the reproductive organs, with a close-up view of the uterus and vagina.  Abnormal uterine bleeding is unusual bleeding from the uterus. It includes bleeding after sex, or bleeding or spotting between menstrual periods. It may also include bleeding that is heavier than normal, menstrual periods that last longer than usual, or bleeding that occurs after menopause.    Abnormal uterine bleeding can affect teenagers, women in their reproductive years, pregnant women, and women who have reached menopause. Common causes of abnormal uterine bleeding include:  Pregnancy.  Abnormal growths within the lining of the uterus (polyps).  Benign tumors or growths in the uterus (fibroids). These are not cancer.  Infection.  Cancer.  Too much or too little of some hormones in the body (hormonal imbalances).  Any type of abnormal bleeding should be checked by a health care provider. Many cases are minor and simple to treat, but others may be more serious. Treatment will depend on the cause of the bleeding and how severe it is.    Follow these instructions at home:  Medicines    Take over-the-counter and prescription medicines only as told by your health care provider.  Ask your health care provider about:  Taking medicines such as aspirin and ibuprofen. These medicines can thin your blood. Do not take these medicines unless your health care provider tells you to take them.  Taking over-the-counter medicines, vitamins, herbs, and supplements.  If you were prescribed iron pills, take them as told by your health care provider. Iron pills help to replace iron that your body loses because of this condition.  Managing constipation    In cases of severe bleeding, you may be asked to increase your iron intake to treat anemia. Doing this may cause constipation. To prevent or treat constipation, you may need to:  Drink enough fluid to keep your urine pale yellow.  Take over-the-counter or prescription medicines.  Eat foods that are high in fiber, such as beans, whole grains, and fresh fruits and vegetables.  Limit foods that are high in fat and processed sugars, such as fried or sweet foods.  Activity    Alter your activity to decrease bleeding if you need to change your sanitary pad more than one time every 2 hours:  Lie in bed with your feet raised (elevated).  Place a cold pack on your lower abdomen.  Rest as much as possible until the bleeding stops or slows down.  General instructions    Do not use tampons, douche, or have sex until your health care provider says these things are okay.  Change your sanitary pads often.  Get regular exams. These include pelvic exams and cervical cancer screenings.  It is up to you to get the results of any tests that are done. Ask your health care provider, or the department that is doing the tests, when your results will be ready.  Monitor your condition for any changes. For 2 months, write down:  When your menstrual period starts.  When your menstrual period ends.  When any abnormal vaginal bleeding occurs.  What problems you notice.  Keep all follow-up visits. This is important.  Contact a health care provider if:  You have bleeding that lasts for more than one week.  You feel dizzy at times.  You feel nauseous or you vomit.  You feel light-headed or weak.  You notice any other changes that show that your condition is getting worse.  Get help right away if:  You faint.  You have bleeding that soaks through a sanitary pad every hour.  You have pain in the abdomen.  You have a fever or chills.  You become sweaty or weak.  You pass large blood clots from your vagina.  These symptoms may represent a serious problem that is an emergency. Do not wait to see if the symptoms will go away. Get medical help right away. Call your local emergency services (911 in the [place] Do not drive yourself to the hospital.    Summary  Abnormal uterine bleeding is unusual bleeding from the uterus.  Any type of abnormal bleeding should be checked by a health care provider. Many cases are minor and simple to treat, but others may be more serious.  Treatment will depend on the cause of the bleeding and how severe it is.  Get help right away if you faint, you have bleeding that soaks through a sanitary pad every hour, or you pass large blood clots from your vagina.

## 2024-09-25 NOTE — ED CDU PROVIDER DISPOSITION NOTE - PATIENT PORTAL LINK FT
You can access the FollowMyHealth Patient Portal offered by [facility] by registering at the following website: [email or web address]. By joining [facility]’s FollowMyHealth portal, you will also be able to view your health information using other applications (apps) compatible with our system.

## 2025-01-17 NOTE — ED ADULT TRIAGE NOTE - CHIEF COMPLAINT QUOTE
Rounding Completed    Plan of Care reviewed. Waiting for Bed assignment.  Elimination needs assessed.  Provided Pt's family brought Pt some food. Updated Family that there is still no bed assigned at this time. VSS.  Monitoring continued.    Bed is locked and in lowest position. Call light within reach.   vaginal bleeding x 2 month with dizziness and general weakness